# Patient Record
Sex: FEMALE | Race: WHITE
[De-identification: names, ages, dates, MRNs, and addresses within clinical notes are randomized per-mention and may not be internally consistent; named-entity substitution may affect disease eponyms.]

---

## 2023-05-06 ENCOUNTER — HOSPITAL ENCOUNTER (EMERGENCY)
Dept: HOSPITAL 54 - ER | Age: 23
LOS: 1 days | Discharge: LEFT BEFORE DISCHARGE | End: 2023-05-07
Payer: COMMERCIAL

## 2023-05-06 DIAGNOSIS — Z53.21: Primary | ICD-10-CM

## 2025-04-04 ENCOUNTER — OFFICE (OUTPATIENT)
Dept: URBAN - METROPOLITAN AREA CLINIC 57 | Facility: CLINIC | Age: 25
End: 2025-04-04

## 2025-04-04 VITALS
RESPIRATION RATE: 16 BRPM | SYSTOLIC BLOOD PRESSURE: 108 MMHG | WEIGHT: 136 LBS | HEIGHT: 59 IN | TEMPERATURE: 98.4 F | DIASTOLIC BLOOD PRESSURE: 76 MMHG | HEART RATE: 78 BPM

## 2025-04-04 DIAGNOSIS — K21.9 GERD (GASTROESOPHAGEAL REFLUX DISEASE): ICD-10-CM

## 2025-04-04 DIAGNOSIS — K59.00 CONSTIPATION: ICD-10-CM

## 2025-04-04 DIAGNOSIS — K58.1 IRRITABLE BOWEL SYNDROME WITH CONSTIPATION: ICD-10-CM

## 2025-04-04 DIAGNOSIS — R11.10 VOMITING: ICD-10-CM

## 2025-04-04 DIAGNOSIS — R10.9 ABDOMINAL CRAMPING: ICD-10-CM

## 2025-04-04 PROCEDURE — 99204 OFFICE O/P NEW MOD 45 MIN: CPT | Performed by: INTERNAL MEDICINE

## 2025-04-04 NOTE — SERVICEHPINOTES
Patient is a 24-year-old female with a history of gastritis, hiatal hernia, IBS, and ulcers, presenting for evaluation of persistent nausea, abdominal cramping, and emesis with hematemesis.Patient reports constant nausea, particularly postprandial, persisting for approximately three weeks. She also experiences abdominal cramping and emesis, occasionally noting flecks of blood. This has been a lonstanding problem. She is currently taking omeprazole in the morning and sucralfate before meals for acid reflux, but continues to experience symptoms. She also takes dicyclomine for IBS, which she reports as beneficial. She denies any history of cardiac or pulmonary issues, tuberculosis, diabetes, or hepatitis. She has not undergone a gastric emptying study.Patient has a history of polysubstance abuse, including Xanax and painkillers, with the last reported use two years ago. She is currently under the care of Dr. Galvan for addiction management and receives Vivitrol injections. She also has a history of bipolar disorder and depression, previously managed with lithium, mirtazapine, venlafaxine, and Vraylar, but is currently taking Seroquel and trazodone. She uses prazosin as needed for night terrors and denies current use of gabapentin, haloperidol, or zolpidem. She has a history of anorexia nervosa, with a reported weight of 80 lbs at the time. She denies any known drug allergies and has not completed the COVID-19 vaccination series.Patient is a  at a [employer], [employer], and reports vaping. She denies current alcohol consumption. Family history is significant for gastritis, ulcers, hemorrhoids, and celiac disease in her father.Past Diagnostic Results:br- Colonoscopy (2017): Performed when patient was 16 years old, reportedly negative.br- Endoscopy (2024): Performed by Dr. Guillen, revealed normal duodenum, stomach, and esophagus biopsies were normal.br- Abdominal Ultrasound (2023): Normal findings.br- CT Scan (2024): Revealed a distended stomach and a small amount of free fluid in the pelvis endoscopy was recommended.br- Lab Work (December 2024):br  - Glucose: 89 mg/dLbr  - BUN: 7 mg/dLbr  - Creatinine: 0.8 mg/dLbr  - AST: 14 U/Lbr  - ALT: 10 U/Lbr  - Alkaline Phosphatase: 47 U/Lbr  - Hemoglobin A1c: 4.9%br  - WBC: 6.6 x10^3/uLbr  - Hemoglobin: 13.7 g/dLbr  - Hematocrit: 41.7%br  - Platelets: 238 x10^3/uLPatient was informed that the conversation was recorded for scribing purposes. Patient has given verbal consent.